# Patient Record
Sex: FEMALE | Race: WHITE | NOT HISPANIC OR LATINO | Employment: STUDENT | URBAN - METROPOLITAN AREA
[De-identification: names, ages, dates, MRNs, and addresses within clinical notes are randomized per-mention and may not be internally consistent; named-entity substitution may affect disease eponyms.]

---
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
## 2023-03-22 ENCOUNTER — OFFICE VISIT (OUTPATIENT)
Dept: FAMILY MEDICINE CLINIC | Facility: CLINIC | Age: 18
End: 2023-03-22

## 2023-03-22 VITALS
HEART RATE: 104 BPM | OXYGEN SATURATION: 98 % | TEMPERATURE: 99.1 F | WEIGHT: 175.8 LBS | BODY MASS INDEX: 32.35 KG/M2 | RESPIRATION RATE: 16 BRPM | HEIGHT: 62 IN | DIASTOLIC BLOOD PRESSURE: 64 MMHG | SYSTOLIC BLOOD PRESSURE: 128 MMHG

## 2023-03-22 DIAGNOSIS — M25.571 ACUTE RIGHT ANKLE PAIN: Primary | ICD-10-CM

## 2023-03-22 DIAGNOSIS — Z3A.19 19 WEEKS GESTATION OF PREGNANCY: ICD-10-CM

## 2023-03-22 NOTE — ASSESSMENT & PLAN NOTE
- Recommend OTC Tylenol  Avoid ibuprofen due to pregnancy  - Can wear ankle brace for support  - Elevate when at rest    - Apply ice as needed  - Ankle exercises provided  - Contact office if no improvement

## 2023-03-22 NOTE — PATIENT INSTRUCTIONS
Ankle Exercises   AMBULATORY CARE:   What you need to know about ankle exercises: Ankle exercises help strengthen your ankle and improve its function after injury  These are beginning exercises  Ask your healthcare provider if you need to see a physical therapist for more advanced exercises  General guidelines for ankle exercises:   Do these exercises 3 to 5 days a week, or as directed by your healthcare provider  Ask if you should do the exercises on each ankle  Do the exercises in the order that your healthcare provider recommends  This will help prevent swelling, chronic pain, and reinjury  Start with range of motion exercises  Then move to strengthening exercises, and finally to balancing exercises  Warm up before you do ankle exercises  Walk or ride a stationary bike for 5 to 10 minutes to prepare your ankle for movement  Stop if you feel pain  It is normal to feel some discomfort at first but you should not feel pain  Tell your doctor or physical therapist if you have pain while you exercise  Regular exercise will help decrease your discomfort over time  How to perform range of motion exercises safely:  Begin with range of motion exercises to improve flexibility  Ask your healthcare provider when you can progress to strengthening exercises  Ankle alphabet:  Sit on a chair so that your feet do not touch the floor  Use your big toe to write each letter of the alphabet  Use only your foot and ankle, and keep your movements small  Do 2 sets  Calf stretches:      Sitting calf stretches with a towel:  Sit on the floor with both legs out straight in front of you  Loop a towel around the ball of your injured foot  Grasp the ends of the towel and pull it toward you  Keep your leg and back straight  Do not lean forward as you pull the towel  Hold for 30 seconds  Then relax for 30 seconds  Do 2 sets of 10           Standing calf stretches:  Stand facing a wall with the foot that is not injured forward and your knee slightly bent  Keep the leg with the injured foot straight and behind you with your toes pointed in slightly  With both heels flat on the floor, press your hips forward  Do not arch your back  Hold for 30 seconds, and then relax for 30 seconds  Do 2 sets of 10  Repeat with your leg bent  Do 2 sets of 10  How to perform strengthening exercises safely:  After you can perform range of motion exercises without pain, you may begin strengthening exercises  Ask your healthcare provider when you can progress to balancing exercises  Ankle movement in 4 directions:  Sit on the floor with your legs straight in front of you  Keep your heels on the floor for support  Dorsiflexion:  Begin with your toes pointing straight up  Pull your toes toward your body  Slowly return to the starting position  Do 3 sets of 5  Plantar flexion:  Begin with your toes pointing straight up  Push your toes away from your body  Slowly return to the starting position  Do 3 sets of 5  Inversion:  Begin with your toes pointing straight up  Push your toes inward, toward each other  Slowly return to the starting position  Do 3 sets of 5  Eversion:  Begin with your toes pointing straight up  Push your toes outward, away from each other  Slowly return to the starting position  Do 3 sets of 5  Toe curls with a towel:  Sit on a chair so that both of your feet are flat on the floor  Place a small towel on the floor in front of your injured foot  Grab the center of the towel with your toes and curl the towel toward you  Relax and repeat  Do 1 set of 5  [place] pick-ups:  Sit on a chair so that both of your feet are flat on the floor  Place 20 marbles on the floor in front of your injured foot  Use your toes to  one marble at a time and place it into a bowl  Repeat until you have picked up all the marbles  Do 1 set       Heel raises:      Single leg heel raises:  Stand with your weight evenly on both feet  Hold on to a chair or a wall for balance  Lift the foot that is not injured off the floor so all your weight is placed on your injured foot  Raise the heel of your injured foot as high as you can  Slowly lower your heel to the floor  Do 1 set of 10  Double leg heel raises:  Stand with your weight evenly on both feet  Hold on to a chair or a wall for balance  Raise both of your heels as high as you can  Slowly lower your heels to the floor  Do 1 set of 10  Heel and toe walks:      Heel walks:  Begin in a standing position  Lift your toes off the floor and walk on your heels  Keep your toes lifted as high as possible  Do 2 sets of 10  Toe walks:  Begin in a standing position  Lift your heels off the floor and walk on the balls and toes of your feet  Keep your heels lifted as high as possible  Do 2 sets of 10  How to perform a balance exercise safely:  After you can perform strengthening exercises without pain, you may do this beginning balancing exercise  Ask your healthcare provider for more advanced balance exercises  Single leg stance:  Stand with your weight evenly on both feet, or hold on to a chair or a wall  Do not lean to the side  Lift the foot that is not injured off the floor so all your weight is placed on your injured foot  Balance on your injured foot  Ask your healthcare provider how long to hold this position  Call your doctor or physical therapist if:   You have new pain, or your pain becomes worse  You have questions or concerns about your condition, care, or exercise program     © Copyright Jessy Peabody 2022 Information is for End User's use only and may not be sold, redistributed or otherwise used for commercial purposes  The above information is an  only  It is not intended as medical advice for individual conditions or treatments   Talk to your doctor, nurse or pharmacist before following any medical regimen to see if it is safe and effective for you

## 2023-03-22 NOTE — PROGRESS NOTES
Name: Krista Motley      : 2005      MRN: [id number]  Encounter Provider: ANDRZEJ Nowak  Encounter Date: 3/22/2023   Encounter department: [facility] [address] [facility]    Assessment & Plan     1  Acute right ankle pain  Assessment & Plan:  - Recommend OTC Tylenol  Avoid ibuprofen due to pregnancy  - Can wear ankle brace for support  - Elevate when at rest    - Apply ice as needed  - Ankle exercises provided  - Contact office if no improvement  2  19 weeks gestation of pregnancy  Assessment & Plan:  - Recommend prenatal vitamin    - Continue routine follow up with OB/GYN  Subjective     Patient presents today with complaints of right ankle pain for the past 2 months  Denies any injury or trauma  States it hurts when she moves her ankle in a [place]  Also hurts if she is sitting on the floor with her legs crossed  Denies any swelling or bruising  Took Tylenol OTC with no relief  She is currently pregnant  Review of Systems   Constitutional: Negative for fatigue and fever  HENT: Negative for trouble swallowing  Eyes: Negative for visual disturbance  Respiratory: Negative for cough and shortness of breath  Cardiovascular: Negative for chest pain and palpitations  Gastrointestinal: Negative for abdominal pain and blood in stool  Endocrine: Negative for cold intolerance and heat intolerance  Genitourinary: Negative for difficulty urinating and dysuria  Musculoskeletal: Positive for arthralgias (right ankle)  Negative for gait problem  Skin: Negative for rash  Neurological: Negative for dizziness, syncope and headaches  Hematological: Negative for adenopathy  Psychiatric/Behavioral: Negative for behavioral problems         Past Medical History:   Diagnosis Date   • Acne    • Allergic    • Anxiety 2021   • Depression 2021   • Gastroesophageal reflux disease without esophagitis 3/10/2020   • Urinary tract infection without hematuria 7/30/2019     Past Surgical History:   Procedure Laterality Date   • WISDOM TOOTH EXTRACTION       Family History   Problem Relation Age of Onset   • Asthma Brother    • Diabetes type II Maternal Grandfather      Social History     Socioeconomic History   • Marital status: Single     Spouse name: None   • Number of children: None   • Years of education: None   • Highest education level: None   Occupational History   • None   Tobacco Use   • Smoking status: Never   • Smokeless tobacco: Never   Vaping Use   • Vaping Use: Never used   Substance and Sexual Activity   • Alcohol use: No   • Drug use: No   • Sexual activity: Never   Other Topics Concern   • None   Social History Narrative    Lives with mother and step father    Has 4 siblings     Social Determinants of Health     Financial Resource Strain: Not on file   Food Insecurity: Not on file   Transportation Needs: Not on file   Physical Activity: Not on file   Stress: Not on file   Social Connections: Not on file   Intimate Partner Violence: Not on file   Housing Stability: Not on file     Current Outpatient Medications on File Prior to Visit   Medication Sig   • benzoyl peroxide 10 % gel Apply 1 application topically daily (Patient not taking: Reported on 11/14/2022)   • buPROPion (Wellbutrin XL) 150 mg 24 hr tablet Take 1 tablet (150 mg total) by mouth every morning (Patient not taking: Reported on 12/26/2022)   • clindamycin (CLEOCIN T) 1 % external solution Apply topically 2 (two) times a day (Patient not taking: Reported on 12/26/2022)   • clindamycin-benzoyl peroxide (BENZACLIN) gel Apply topically 2 (two) times a day (Patient not taking: Reported on 9/19/2022)   • Lidocaine Viscous HCl (XYLOCAINE) 2 % mucosal solution Swish and spit 10 mL 4 (four) times a day as needed for mouth pain or discomfort (Patient not taking: Reported on 4/18/2022)   • norethindrone-ethinyl estradiol (JUNEL FE 1/20) 1-20 MG-MCG per tablet Take 1 tablet by mouth daily (Patient not taking: Reported on 4/18/2022)   • omeprazole (PriLOSEC) 20 mg delayed release capsule TAKE ONE CAPSULE BY MOUTH DAILY BEFORE BREAKFAST (Patient not taking: No sig reported)     Allergies   Allergen Reactions   • No Active Allergies      Immunization History   Administered Date(s) Administered   • DTaP [birth year], 01/13/2004, 10/19/2004, 08/31/2007   • H1N1, All Formulations 10/20/2009   • HPV 07/22/2014   • HPV Quadrivalent 09/10/2015   • Hep A, ped/adol, 2 dose 03/03/2011, 09/29/2011   • Hep B, Adolescent or Pediatric [birth year], 01/13/2004, 04/19/2021, 08/04/2021   • Hep B, adult 10/21/2021   • Hepatitis A 03/03/2011, 09/29/2011   • HiB 2005, 2005, 01/13/2004, 10/19/2004   • Hib (PRP-T) 2005, 2005, 01/13/2004, 10/19/2004   • INFLUENZA 10/19/2004, 12/15/2006, 11/15/2007, 11/13/2014, 12/12/2022   • IPV 2005, 2005, 01/13/2004, 08/31/2007   • Influenza Quadrivalent Preservative Free 3 years and older IM 10/13/2021   • Influenza, injectable, quadrivalent, preservative free 0 5 mL 12/13/2019   • MMR 08/27/2004   • MMRV 08/31/2007   • Meningococcal ACWY, unspecified 07/22/2014   • Meningococcal MCV4P 08/07/2019   • Pneumococcal Conjugate PCV 7 2005, 2005, 01/13/2004, 10/19/2004   • Tdap 07/22/2014, 08/04/2021   • Varicella 08/27/2004, 07/14/2008       Objective     /64 (BP Location: Left arm, Patient Position: Sitting, Cuff Size: Standard)   Pulse 104   Temp 99 1 °F (37 3 °C) (Tympanic)   Resp 16   Ht 5' 2" (1 575 m)   Wt 79 7 kg (175 lb 12 8 oz)   SpO2 98%   BMI 32 15 kg/m²     Physical Exam  Vitals and nursing note reviewed  Constitutional:       Appearance: Normal appearance  HENT:      Head: Normocephalic and atraumatic  Right Ear: External ear normal       Left Ear: External ear normal    Eyes:      Conjunctiva/sclera: Conjunctivae normal    Cardiovascular:      Rate and Rhythm: Normal rate and regular rhythm        Heart sounds: Normal heart sounds  Pulmonary:      Effort: Pulmonary effort is normal       Breath sounds: Normal breath sounds  Musculoskeletal:         General: Normal range of motion  Cervical back: Normal range of motion  Right lower leg: Edema present  Left lower leg: Edema present  Right ankle: No ecchymosis  No tenderness  Normal range of motion  Skin:     General: Skin is warm and dry  Neurological:      Mental Status: She is alert and oriented to person, place, and time  Cranial Nerves: No cranial nerve deficit     Psychiatric:         Mood and Affect: Mood normal          Behavior: Behavior normal        ANDRZEJ Polk

## 2023-03-27 ENCOUNTER — TELEPHONE (OUTPATIENT)
Dept: PSYCHIATRY | Facility: CLINIC | Age: 18
End: 2023-03-27

## 2024-02-21 PROBLEM — Z00.121 ENCOUNTER FOR ROUTINE CHILD HEALTH EXAMINATION WITH ABNORMAL FINDINGS: Status: RESOLVED | Noted: 2022-08-23 | Resolved: 2024-02-21

## 2024-02-21 PROBLEM — N39.0 URINARY TRACT INFECTION WITHOUT HEMATURIA: Status: RESOLVED | Noted: 2019-07-30 | Resolved: 2024-02-21

## 2024-09-13 ENCOUNTER — OFFICE VISIT (OUTPATIENT)
Dept: URGENT CARE | Age: 19
End: 2024-09-13
Payer: COMMERCIAL

## 2024-09-13 VITALS
TEMPERATURE: 97.2 F | DIASTOLIC BLOOD PRESSURE: 84 MMHG | RESPIRATION RATE: 18 BRPM | HEIGHT: 63 IN | BODY MASS INDEX: 31.68 KG/M2 | SYSTOLIC BLOOD PRESSURE: 129 MMHG | OXYGEN SATURATION: 98 % | WEIGHT: 178.8 LBS | HEART RATE: 79 BPM

## 2024-09-13 DIAGNOSIS — J30.2 SEASONAL ALLERGIC RHINITIS, UNSPECIFIED TRIGGER: Primary | ICD-10-CM

## 2024-09-13 DIAGNOSIS — J02.0 STREP THROAT: ICD-10-CM

## 2024-09-13 DIAGNOSIS — Z02.89 ENCOUNTER TO OBTAIN EXCUSE FROM WORK: ICD-10-CM

## 2024-09-13 PROCEDURE — 99213 OFFICE O/P EST LOW 20 MIN: CPT | Performed by: EMERGENCY MEDICINE

## 2024-09-13 NOTE — PATIENT INSTRUCTIONS
Benadryl at night    Resume daily antihistamine    Flonase or Astelin    Vicks    Tylenol/motrin

## 2024-09-13 NOTE — PROGRESS NOTES
[facility]        NAME: Lori Miller is a 19 y.o. female  : 2005    MRN: [id number]  DATE: 2024  TIME: 6:53 PM    Assessment and Plan   Seasonal allergic rhinitis, unspecified trigger [J30.2]  1. Seasonal allergic rhinitis, unspecified trigger        2. Strep throat        HA, sinus pressure- being treated for strep ( was in ER 2 days ago, kids positive, treated with Amox , no testing done). No fevers. No meds for congestion. Hx allergies. Discussed symptom management. Requesting work note. Advised afebrile, safe to return.     Patient Instructions       Follow up with PCP in 3-5 days.  Proceed to  ER if symptoms worsen.    If tests have been performed at [facility], our office will contact you with results if changes need to be made to the care plan discussed with you at the visit.  You can review your full results on [facility].    Chief Complaint     Chief Complaint   Patient presents with    Headache    Facial Pain     Patient reports that she has been sick for the past 7 days, past 5 days have had a headache and nasal pressure.          History of Present Illness       HA, sinus pressure- being treated for strep ( was in ER 2 days ago, kids positive, treated with Amox , no testing done). No fevers. No meds for congestion. Hx allergies. Discussed symptom management. Requesting work note. Advised afebrile, safe to return.         Review of Systems   Review of Systems   Constitutional:  Negative for fever.   HENT:  Positive for congestion, rhinorrhea, sinus pressure and sore throat.    Respiratory:  Negative for cough.    Allergic/Immunologic: Positive for environmental allergies.         Current Medications       Current Outpatient Medications:     benzoyl peroxide 10 % gel, Apply 1 application topically daily (Patient not taking: Reported on 2022), Disp: 45 g, Rfl: 0    buPROPion (Wellbutrin XL) 150 mg 24 hr tablet, Take 1 tablet (150 mg total) by mouth every morning  "(Patient not taking: Reported on 12/26/2022), Disp: 30 tablet, Rfl: 1    clindamycin (CLEOCIN T) 1 % external solution, Apply topically 2 (two) times a day (Patient not taking: Reported on 12/26/2022), Disp: 30 mL, Rfl: 0    clindamycin-benzoyl peroxide (BENZACLIN) gel, Apply topically 2 (two) times a day (Patient not taking: Reported on 9/19/2022), Disp: 25 g, Rfl: 0    Lidocaine Viscous HCl (XYLOCAINE) 2 % mucosal solution, Swish and spit 10 mL 4 (four) times a day as needed for mouth pain or discomfort (Patient not taking: Reported on 4/18/2022), Disp: 100 mL, Rfl: 1    norethindrone-ethinyl estradiol (JUNEL FE 1/20) 1-20 MG-MCG per tablet, Take 1 tablet by mouth daily (Patient not taking: Reported on 4/18/2022), Disp: , Rfl:     omeprazole (PriLOSEC) 20 mg delayed release capsule, TAKE ONE CAPSULE BY MOUTH DAILY BEFORE BREAKFAST (Patient not taking: No sig reported), Disp: 30 capsule, Rfl: 3    Current Allergies     Allergies as of 09/13/2024 - Reviewed 09/13/2024   Allergen Reaction Noted    No active allergies  05/09/2018            The following portions of the patient's history were reviewed and updated as appropriate: allergies, current medications, past family history, past medical history, past social history, past surgical history and problem list.     Past Medical History:   Diagnosis Date    Acne     Allergic     Anxiety 2/28/2021    Depression 2/4/2021    Gastroesophageal reflux disease without esophagitis 3/10/2020    Urinary tract infection without hematuria 7/30/2019       Past Surgical History:   Procedure Laterality Date    WISDOM TOOTH EXTRACTION         Family History   Problem Relation Age of Onset    Asthma Brother     Diabetes type II Maternal Grandfather          Medications have been verified.        Objective   /84   Pulse 79   Temp (!) 97.2 °F (36.2 °C) (Tympanic)   Resp 18   Ht 5' 3\" (1.6 m)   Wt 81.1 kg (178 lb 12.8 oz)   SpO2 98%   BMI 31.67 kg/m²   No LMP recorded.     "   Physical Exam     Physical Exam  Vitals reviewed.   Constitutional:       Appearance: Normal appearance.   HENT:      Nose: Congestion present.   Cardiovascular:      Rate and Rhythm: Normal rate and regular rhythm.      Pulses: Normal pulses.      Heart sounds: Normal heart sounds.   Pulmonary:      Effort: Pulmonary effort is normal.      Breath sounds: Normal breath sounds.   Lymphadenopathy:      Cervical: No cervical adenopathy.   Neurological:      Mental Status: She is alert.

## 2024-09-16 ENCOUNTER — OFFICE VISIT (OUTPATIENT)
Dept: FAMILY MEDICINE CLINIC | Facility: CLINIC | Age: 19
End: 2024-09-16
Payer: COMMERCIAL

## 2024-09-16 VITALS
TEMPERATURE: 97.7 F | DIASTOLIC BLOOD PRESSURE: 72 MMHG | OXYGEN SATURATION: 97 % | HEIGHT: 63 IN | WEIGHT: 181 LBS | SYSTOLIC BLOOD PRESSURE: 112 MMHG | RESPIRATION RATE: 16 BRPM | HEART RATE: 89 BPM | BODY MASS INDEX: 32.07 KG/M2

## 2024-09-16 DIAGNOSIS — J01.80 ACUTE NON-RECURRENT SINUSITIS OF OTHER SINUS: Primary | ICD-10-CM

## 2024-09-16 DIAGNOSIS — Z00.01 ABNORMAL WELLNESS EXAM: ICD-10-CM

## 2024-09-16 DIAGNOSIS — F32.89 OTHER DEPRESSION: ICD-10-CM

## 2024-09-16 DIAGNOSIS — R79.89 ABNORMAL TSH: ICD-10-CM

## 2024-09-16 PROBLEM — J01.90 ACUTE INFECTION OF NASAL SINUS: Status: ACTIVE | Noted: 2024-09-16

## 2024-09-16 PROCEDURE — 99395 PREV VISIT EST AGE 18-39: CPT | Performed by: FAMILY MEDICINE

## 2024-09-16 PROCEDURE — 99214 OFFICE O/P EST MOD 30 MIN: CPT | Performed by: FAMILY MEDICINE

## 2024-09-16 RX ORDER — NORGESTIMATE AND ETHINYL ESTRADIOL 0.25-0.035
1 KIT ORAL DAILY
COMMUNITY
Start: 2024-06-13 | End: 2025-06-13

## 2024-09-16 RX ORDER — AZITHROMYCIN 250 MG/1
TABLET, FILM COATED ORAL
Qty: 6 TABLET | Refills: 0 | Status: SHIPPED | OUTPATIENT
Start: 2024-09-16 | End: 2024-09-20

## 2024-09-16 RX ORDER — AMOXICILLIN 500 MG/1
500 TABLET, FILM COATED ORAL 3 TIMES DAILY
COMMUNITY
Start: 2024-09-12 | End: 2024-09-22

## 2024-09-16 RX ORDER — PREDNISONE 50 MG/1
50 TABLET ORAL DAILY
Qty: 5 TABLET | Refills: 0 | Status: SHIPPED | OUTPATIENT
Start: 2024-09-16 | End: 2024-09-21

## 2024-09-16 NOTE — ASSESSMENT & PLAN NOTE
Pt advised to discontinue amoxicillin that was given in ED, and begin azithromycin 250 mg and prednisone 50mg, as prescribed. Continue with proper hydration and rest.   Orders:    azithromycin (ZITHROMAX) 250 mg tablet; Take 2 tablets today then 1 tablet daily x 4 days    predniSONE 50 mg tablet; Take 1 tablet (50 mg total) by mouth daily for 5 days

## 2024-09-16 NOTE — PROGRESS NOTES
Ambulatory Visit  Name: Lori Miller      : 2005      MRN: [id number]  Encounter Provider: Monisha Sarah MD  Encounter Date: 2024   Encounter department:  [facility] [address] [facility]    Assessment & Plan  Acute non-recurrent sinusitis of other sinus  Pt advised to discontinue amoxicillin that was given in ED, and begin azithromycin 250 mg and prednisone 50mg, as prescribed. Continue with proper hydration and rest.   Orders:    azithromycin (ZITHROMAX) 250 mg tablet; Take 2 tablets today then 1 tablet daily x 4 days    predniSONE 50 mg tablet; Take 1 tablet (50 mg total) by mouth daily for 5 days    Abnormal TSH  TSH level ordered for scheduled follow-up in 10 days.   Orders:    TSH, 3rd generation with Free T4 reflex; Future    Other depression  Well-controlled on medications.  Will continue to monitor.         Abnormal wellness exam  Discussed immunizations, diet, and safety measures.               History of Present Illness     KS is a 20 yo female with no significant PMH presenting to the office with an acute complaint of headaches and congestion x1 week. Pt notes congestion with a thick white mucous, headaches, fatigue, some nausea, and a resolved sore throat, but denies cough, fever, SOB, and chest pain. All other ROS were negative. Pt was given amoxicillin after presenting to the ED 5 days ago, and Flonase after presenting to the Urgent Care 3 days ago, but notes symptom persistence. Pt notes that her two young kids recently recovered from Strep throat, but no testing was completed for her at the ED or urgent care.       Headache    Review of Systems   Constitutional:  Positive for fatigue. Negative for chills and fever.   HENT:  Negative for ear pain and sore throat.    Eyes:  Negative for pain and visual disturbance.   Respiratory:  Negative for cough and shortness of breath.    Cardiovascular:  Negative for chest pain and palpitations.   Gastrointestinal:  Positive for nausea.  Negative for abdominal pain and vomiting.   Genitourinary:  Negative for dysuria and hematuria.   Musculoskeletal:  Negative for arthralgias and back pain.   Skin:  Negative for color change and rash.   Neurological:  Positive for headaches. Negative for seizures and syncope.   All other systems reviewed and are negative.    Past Medical History:   Diagnosis Date    Acne     Allergic     Anxiety 2/28/2021    Depression 2/4/2021    Gastroesophageal reflux disease without esophagitis 3/10/2020    Urinary tract infection without hematuria 7/30/2019     Past Surgical History:   Procedure Laterality Date    WISDOM TOOTH EXTRACTION       Family History   Problem Relation Age of Onset    Asthma Brother     Diabetes type II Maternal Grandfather      Social History     Tobacco Use    Smoking status: Never    Smokeless tobacco: Never   Vaping Use    Vaping status: Never Used   Substance and Sexual Activity    Alcohol use: No    Drug use: No    Sexual activity: Never     Current Outpatient Medications on File Prior to Visit   Medication Sig    amoxicillin (AMOXIL) 500 MG tablet Take 500 mg by mouth Three times a day    norgestimate-ethinyl estradiol (ORTHO-CYCLEN) 0.25-35 MG-MCG per tablet Take 1 tablet by mouth daily    [DISCONTINUED] benzoyl peroxide 10 % gel Apply 1 application topically daily (Patient not taking: Reported on 11/14/2022)    [DISCONTINUED] buPROPion (Wellbutrin XL) 150 mg 24 hr tablet Take 1 tablet (150 mg total) by mouth every morning (Patient not taking: Reported on 12/26/2022)    [DISCONTINUED] clindamycin (CLEOCIN T) 1 % external solution Apply topically 2 (two) times a day (Patient not taking: Reported on 12/26/2022)    [DISCONTINUED] clindamycin-benzoyl peroxide (BENZACLIN) gel Apply topically 2 (two) times a day (Patient not taking: Reported on 9/19/2022)    [DISCONTINUED] Lidocaine Viscous HCl (XYLOCAINE) 2 % mucosal solution Swish and spit 10 mL 4 (four) times a day as needed for mouth pain or  "discomfort (Patient not taking: Reported on 4/18/2022)    [DISCONTINUED] norethindrone-ethinyl estradiol (JUNEL FE 1/20) 1-20 MG-MCG per tablet Take 1 tablet by mouth daily (Patient not taking: Reported on 4/18/2022)    [DISCONTINUED] omeprazole (PriLOSEC) 20 mg delayed release capsule TAKE ONE CAPSULE BY MOUTH DAILY BEFORE BREAKFAST (Patient not taking: No sig reported)     Allergies   Allergen Reactions    No Active Allergies      Immunization History   Administered Date(s) Administered    DTaP 2003, 2003, 01/13/2004, 10/19/2004, 08/31/2007    H1N1, All Formulations 10/20/2009    HPV 07/22/2014    HPV Quadrivalent 09/10/2015    Hep A, ped/adol, 2 dose 03/03/2011, 09/29/2011    Hep B, Adolescent or Pediatric 2003, 2003, 01/13/2004, 04/19/2021, 08/04/2021    Hep B, adult 10/21/2021    Hepatitis A 03/03/2011, 09/29/2011    HiB 2003, 2003, 01/13/2004, 10/19/2004    Hib (PRP-T) 2003, 2003, 01/13/2004, 10/19/2004    INFLUENZA 10/19/2004, 12/15/2006, 11/15/2007, 11/13/2014, 12/12/2022    IPV 2003, 2003, 01/13/2004, 08/31/2007    Influenza Quadrivalent Preservative Free 3 years and older IM 10/13/2021    Influenza, injectable, quadrivalent, preservative free 0.5 mL 12/13/2019    MMR 08/27/2004    MMRV 08/31/2007    Meningococcal ACWY, unspecified 07/22/2014    Meningococcal MCV4P 08/07/2019    Pneumococcal Conjugate PCV 7 2003, 2003, 01/13/2004, 10/19/2004    Tdap 07/22/2014, 08/04/2021    Varicella 08/27/2004, 07/14/2008     Objective     /72 (BP Location: Left arm, Patient Position: Sitting, Cuff Size: Standard)   Pulse 89   Temp 97.7 °F (36.5 °C) (Tympanic)   Resp 16   Ht 5' 3\" (1.6 m)   Wt 82.1 kg (181 lb)   SpO2 97%   BMI 32.06 kg/m²     Physical Exam  Vitals and nursing note reviewed.   Constitutional:       General: She is not in acute distress.     Appearance: She is well-developed. She is ill-appearing.   HENT:      Head: " Normocephalic and atraumatic.      Right Ear: Tympanic membrane, ear canal and external ear normal. There is no impacted cerumen.      Left Ear: Tympanic membrane, ear canal and external ear normal. There is no impacted cerumen.      Nose: Congestion present.      Mouth/Throat:      Mouth: Mucous membranes are moist.      Pharynx: Oropharynx is clear.   Eyes:      Conjunctiva/sclera: Conjunctivae normal.   Cardiovascular:      Rate and Rhythm: Normal rate and regular rhythm.      Heart sounds: Normal heart sounds. No murmur heard.     No friction rub. No gallop.   Pulmonary:      Effort: Pulmonary effort is normal. No respiratory distress.      Breath sounds: Normal breath sounds. No wheezing, rhonchi or rales.   Musculoskeletal:         General: No swelling.      Cervical back: Neck supple.   Skin:     General: Skin is warm and dry.      Capillary Refill: Capillary refill takes less than 2 seconds.   Neurological:      Mental Status: She is alert.   Psychiatric:         Mood and Affect: Mood normal.

## 2024-09-16 NOTE — ASSESSMENT & PLAN NOTE
TSH level ordered for scheduled follow-up in 10 days.   Orders:    TSH, 3rd generation with Free T4 reflex; Future

## 2024-09-19 ENCOUNTER — NURSE TRIAGE (OUTPATIENT)
Dept: OTHER | Facility: OTHER | Age: 19
End: 2024-09-19

## 2024-09-20 NOTE — TELEPHONE ENCOUNTER
"Regarding: high bp/ chest tightness, nausea and dizziness  ----- Message from Santa DUMAS sent at 9/19/2024  9:32 PM EDT -----  Patient called, \"  I have high blood pressure. My recent reading is 138/94 with HR of 121. I feel a little dizzy, nauseous and chest tightness.\"    "
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
## 2024-09-25 ENCOUNTER — APPOINTMENT (OUTPATIENT)
Dept: LAB | Facility: IMAGING CENTER | Age: 19
End: 2024-09-25
Payer: COMMERCIAL

## 2024-09-25 DIAGNOSIS — R79.89 ABNORMAL TSH: ICD-10-CM

## 2024-09-25 LAB
T4 FREE SERPL-MCNC: 0.78 NG/DL (ref 0.61–1.12)
TSH SERPL DL<=0.05 MIU/L-ACNC: 0.34 UIU/ML (ref 0.45–4.5)

## 2024-09-25 PROCEDURE — 36415 COLL VENOUS BLD VENIPUNCTURE: CPT

## 2024-09-25 PROCEDURE — 84439 ASSAY OF FREE THYROXINE: CPT

## 2024-09-25 PROCEDURE — 84443 ASSAY THYROID STIM HORMONE: CPT

## 2024-09-26 ENCOUNTER — OFFICE VISIT (OUTPATIENT)
Dept: FAMILY MEDICINE CLINIC | Facility: CLINIC | Age: 19
End: 2024-09-26
Payer: COMMERCIAL

## 2024-09-26 VITALS
RESPIRATION RATE: 16 BRPM | HEIGHT: 63 IN | WEIGHT: 179.6 LBS | OXYGEN SATURATION: 98 % | DIASTOLIC BLOOD PRESSURE: 80 MMHG | BODY MASS INDEX: 31.82 KG/M2 | TEMPERATURE: 98.6 F | SYSTOLIC BLOOD PRESSURE: 124 MMHG | HEART RATE: 91 BPM

## 2024-09-26 DIAGNOSIS — R00.2 PALPITATION: ICD-10-CM

## 2024-09-26 DIAGNOSIS — R73.9 HYPERGLYCEMIA: ICD-10-CM

## 2024-09-26 DIAGNOSIS — R79.89 ABNORMAL TSH: Primary | ICD-10-CM

## 2024-09-26 PROCEDURE — 99214 OFFICE O/P EST MOD 30 MIN: CPT | Performed by: FAMILY MEDICINE

## 2024-09-26 NOTE — ASSESSMENT & PLAN NOTE
Discussed about low-carb diet and regular exercise.  Continue to monitor fasting glucose and A1c.    Orders:    Hemoglobin A1C; Future

## 2024-09-26 NOTE — PROGRESS NOTES
Ambulatory Visit  Name: Lori Miller      : 2005      MRN: [id number]  Encounter Provider: Monisha Sarah MD  Encounter Date: 2024   Encounter department: [facility]    Assessment & Plan  Abnormal TSH  I am going to repeat TSH with free T4.         Palpitation  I am going to check a blood work and 48 hours Holter monitor.  If symptoms worse go back to the emergency room.    Orders:    CBC and differential; Future    Comprehensive metabolic panel; Future    TSH, 3rd generation with Free T4 reflex; Future    Holter monitor; Future    Hyperglycemia  Discussed about low-carb diet and regular exercise.  Continue to monitor fasting glucose and A1c.    Orders:    Hemoglobin A1C; Future         History of Present Illness     She is here today for follow-up post ER visit for palpitation.  She had blood work done showed low TSH and elevated glucose otherwise blood work came back stable.  She continues to have palpitation on and off.  Denies any chest pain or shortness of breath.  Denies any other complaint.      Review of Systems   Constitutional:  Negative for chills and fever.   HENT:  Negative for trouble swallowing.    Eyes:  Negative for visual disturbance.   Respiratory:  Negative for cough and shortness of breath.    Cardiovascular:  Positive for palpitations. Negative for chest pain and leg swelling.   Gastrointestinal:  Negative for abdominal pain, constipation and diarrhea.   Endocrine: Negative for cold intolerance and heat intolerance.   Genitourinary:  Negative for difficulty urinating and dysuria.   Musculoskeletal:  Negative for gait problem.   Skin:  Negative for rash.   Neurological:  Negative for dizziness, tremors, seizures and headaches.   Hematological:  Negative for adenopathy.   Psychiatric/Behavioral:  Negative for behavioral problems.      Past Medical History:   Diagnosis Date    Acne     Allergic     Anxiety 2021    Depression 2021    Gastroesophageal  reflux disease without esophagitis 3/10/2020    Urinary tract infection without hematuria 7/30/2019     Past Surgical History:   Procedure Laterality Date    WISDOM TOOTH EXTRACTION       Family History   Problem Relation Age of Onset    Asthma Brother     Diabetes type II Maternal Grandfather      Social History     Tobacco Use    Smoking status: Never    Smokeless tobacco: Never   Vaping Use    Vaping status: Never Used   Substance and Sexual Activity    Alcohol use: No    Drug use: No    Sexual activity: Never     Current Outpatient Medications on File Prior to Visit   Medication Sig    norgestimate-ethinyl estradiol (ORTHO-CYCLEN) 0.25-35 MG-MCG per tablet Take 1 tablet by mouth daily     Allergies   Allergen Reactions    No Active Allergies      Immunization History   Administered Date(s) Administered    DTaP 2003, 2003, 01/13/2004, 10/19/2004, 08/31/2007    H1N1, All Formulations 10/20/2009    HPV 07/22/2014    HPV Quadrivalent 09/10/2015    Hep A, ped/adol, 2 dose 03/03/2011, 09/29/2011    Hep B, Adolescent or Pediatric 2003, 2003, 01/13/2004, 04/19/2021, 08/04/2021    Hep B, adult 10/21/2021    Hepatitis A 03/03/2011, 09/29/2011    HiB 2003, 2003, 01/13/2004, 10/19/2004    Hib (PRP-T) 2003, 2003, 01/13/2004, 10/19/2004    INFLUENZA 10/19/2004, 12/15/2006, 11/15/2007, 11/13/2014, 10/13/2021, 12/12/2022    IPV 2003, 2003, 01/13/2004, 08/31/2007    Influenza Quadrivalent Preservative Free 3 years and older IM 10/13/2021    Influenza, injectable, quadrivalent, preservative free 0.5 mL 12/13/2019    MMR 08/27/2004    MMRV 08/31/2007    Meningococcal ACWY, unspecified 07/22/2014    Meningococcal MCV4P 08/07/2019    Pneumococcal Conjugate PCV 7 2003, 2003, 01/13/2004, 10/19/2004    Tdap 07/22/2014, 08/04/2021, 05/16/2023    Varicella 08/27/2004, 07/14/2008     Objective     /80 (BP Location: Left arm, Patient Position: Sitting, Cuff Size:  "Standard)   Pulse 91   Temp 98.6 °F (37 °C) (Tympanic)   Resp 16   Ht 5' 3\" (1.6 m)   Wt 81.5 kg (179 lb 9.6 oz)   SpO2 98%   BMI 31.81 kg/m²     Physical Exam  Vitals and nursing note reviewed.   Constitutional:       Appearance: She is well-developed.   HENT:      Head: Normocephalic and atraumatic.   Eyes:      Pupils: Pupils are equal, round, and reactive to light.   Cardiovascular:      Rate and Rhythm: Normal rate and regular rhythm.      Heart sounds: Normal heart sounds.   Pulmonary:      Effort: Pulmonary effort is normal.      Breath sounds: Normal breath sounds.   Abdominal:      General: Bowel sounds are normal.      Palpations: Abdomen is soft.   Musculoskeletal:      Cervical back: Normal range of motion and neck supple.   Lymphadenopathy:      Cervical: No cervical adenopathy.   Skin:     General: Skin is warm.   Neurological:      Mental Status: She is alert and oriented to person, place, and time.         "

## 2024-09-26 NOTE — ASSESSMENT & PLAN NOTE
I am going to check a blood work and 48 hours Holter monitor.  If symptoms worse go back to the emergency room.    Orders:    CBC and differential; Future    Comprehensive metabolic panel; Future    TSH, 3rd generation with Free T4 reflex; Future    Holter monitor; Future

## 2024-10-08 ENCOUNTER — HOSPITAL ENCOUNTER (OUTPATIENT)
Dept: NON INVASIVE DIAGNOSTICS | Facility: CLINIC | Age: 19
Discharge: HOME | End: 2024-10-08
Payer: COMMERCIAL

## 2024-10-08 DIAGNOSIS — R00.2 PALPITATION: ICD-10-CM

## 2024-10-08 PROCEDURE — 93226 XTRNL ECG REC<48 HR SCAN A/R: CPT

## 2024-10-08 PROCEDURE — 93225 XTRNL ECG REC<48 HRS REC: CPT

## 2024-10-15 PROCEDURE — 93227 XTRNL ECG REC<48 HR R&I: CPT | Performed by: INTERNAL MEDICINE

## 2024-10-16 ENCOUNTER — TELEPHONE (OUTPATIENT)
Age: 19
End: 2024-10-16

## 2024-10-16 PROBLEM — J01.90 ACUTE INFECTION OF NASAL SINUS: Status: RESOLVED | Noted: 2024-09-16 | Resolved: 2024-10-16

## 2024-10-19 ENCOUNTER — APPOINTMENT (OUTPATIENT)
Dept: LAB | Facility: IMAGING CENTER | Age: 19
End: 2024-10-19
Payer: COMMERCIAL

## 2024-10-19 DIAGNOSIS — R00.2 PALPITATION: ICD-10-CM

## 2024-10-19 DIAGNOSIS — R73.9 HYPERGLYCEMIA: ICD-10-CM

## 2024-10-19 LAB
ALBUMIN SERPL BCG-MCNC: 3.9 G/DL (ref 3.5–5)
ALP SERPL-CCNC: 63 U/L (ref 34–104)
ALT SERPL W P-5'-P-CCNC: 13 U/L (ref 7–52)
ANION GAP SERPL CALCULATED.3IONS-SCNC: 8 MMOL/L (ref 4–13)
AST SERPL W P-5'-P-CCNC: 14 U/L (ref 13–39)
BASOPHILS # BLD AUTO: 0.04 THOUSANDS/ΜL (ref 0–0.1)
BASOPHILS NFR BLD AUTO: 1 % (ref 0–1)
BILIRUB SERPL-MCNC: 0.48 MG/DL (ref 0.2–1)
BUN SERPL-MCNC: 10 MG/DL (ref 5–25)
CALCIUM SERPL-MCNC: 8.8 MG/DL (ref 8.4–10.2)
CHLORIDE SERPL-SCNC: 105 MMOL/L (ref 96–108)
CO2 SERPL-SCNC: 26 MMOL/L (ref 21–32)
CREAT SERPL-MCNC: 0.67 MG/DL (ref 0.6–1.3)
EOSINOPHIL # BLD AUTO: 0.09 THOUSAND/ΜL (ref 0–0.61)
EOSINOPHIL NFR BLD AUTO: 1 % (ref 0–6)
ERYTHROCYTE [DISTWIDTH] IN BLOOD BY AUTOMATED COUNT: 12.5 % (ref 11.6–15.1)
EST. AVERAGE GLUCOSE BLD GHB EST-MCNC: 105 MG/DL
GFR SERPL CREATININE-BSD FRML MDRD: 126 ML/MIN/1.73SQ M
GLUCOSE P FAST SERPL-MCNC: 83 MG/DL (ref 65–99)
HBA1C MFR BLD: 5.3 %
HCT VFR BLD AUTO: 42.6 % (ref 34.8–46.1)
HGB BLD-MCNC: 13.6 G/DL (ref 11.5–15.4)
IMM GRANULOCYTES # BLD AUTO: 0.04 THOUSAND/UL (ref 0–0.2)
IMM GRANULOCYTES NFR BLD AUTO: 1 % (ref 0–2)
LYMPHOCYTES # BLD AUTO: 2.51 THOUSANDS/ΜL (ref 0.6–4.47)
LYMPHOCYTES NFR BLD AUTO: 34 % (ref 14–44)
MCH RBC QN AUTO: 29.7 PG (ref 26.8–34.3)
MCHC RBC AUTO-ENTMCNC: 31.9 G/DL (ref 31.4–37.4)
MCV RBC AUTO: 93 FL (ref 82–98)
MONOCYTES # BLD AUTO: 0.51 THOUSAND/ΜL (ref 0.17–1.22)
MONOCYTES NFR BLD AUTO: 7 % (ref 4–12)
NEUTROPHILS # BLD AUTO: 4.19 THOUSANDS/ΜL (ref 1.85–7.62)
NEUTS SEG NFR BLD AUTO: 56 % (ref 43–75)
NRBC BLD AUTO-RTO: 0 /100 WBCS
PLATELET # BLD AUTO: 258 THOUSANDS/UL (ref 149–390)
PMV BLD AUTO: 11.1 FL (ref 8.9–12.7)
POTASSIUM SERPL-SCNC: 4.5 MMOL/L (ref 3.5–5.3)
PROT SERPL-MCNC: 6.7 G/DL (ref 6.4–8.4)
RBC # BLD AUTO: 4.58 MILLION/UL (ref 3.81–5.12)
SODIUM SERPL-SCNC: 139 MMOL/L (ref 135–147)
T4 FREE SERPL-MCNC: 0.73 NG/DL (ref 0.61–1.12)
TSH SERPL DL<=0.05 MIU/L-ACNC: 0.41 UIU/ML (ref 0.45–4.5)
WBC # BLD AUTO: 7.38 THOUSAND/UL (ref 4.31–10.16)

## 2024-10-19 PROCEDURE — 84439 ASSAY OF FREE THYROXINE: CPT

## 2024-10-19 PROCEDURE — 84443 ASSAY THYROID STIM HORMONE: CPT

## 2024-10-19 PROCEDURE — 83036 HEMOGLOBIN GLYCOSYLATED A1C: CPT

## 2024-10-19 PROCEDURE — 36415 COLL VENOUS BLD VENIPUNCTURE: CPT

## 2024-10-19 PROCEDURE — 80053 COMPREHEN METABOLIC PANEL: CPT

## 2024-10-19 PROCEDURE — 85025 COMPLETE CBC W/AUTO DIFF WBC: CPT

## 2025-02-12 ENCOUNTER — OFFICE VISIT (OUTPATIENT)
Dept: FAMILY MEDICINE CLINIC | Facility: CLINIC | Age: 20
End: 2025-02-12
Payer: COMMERCIAL

## 2025-02-12 VITALS
WEIGHT: 197 LBS | HEART RATE: 96 BPM | HEIGHT: 63 IN | SYSTOLIC BLOOD PRESSURE: 118 MMHG | DIASTOLIC BLOOD PRESSURE: 70 MMHG | RESPIRATION RATE: 16 BRPM | BODY MASS INDEX: 34.91 KG/M2 | OXYGEN SATURATION: 98 % | TEMPERATURE: 98.8 F

## 2025-02-12 DIAGNOSIS — J01.80 ACUTE NON-RECURRENT SINUSITIS OF OTHER SINUS: Primary | ICD-10-CM

## 2025-02-12 PROCEDURE — 99213 OFFICE O/P EST LOW 20 MIN: CPT | Performed by: FAMILY MEDICINE

## 2025-02-12 RX ORDER — AMOXICILLIN 500 MG/1
500 CAPSULE ORAL EVERY 8 HOURS SCHEDULED
Qty: 30 CAPSULE | Refills: 0 | Status: SHIPPED | OUTPATIENT
Start: 2025-02-12 | End: 2025-02-22

## 2025-02-12 NOTE — ASSESSMENT & PLAN NOTE
Patient with URI symptoms as noted in the HPI. Will prescribe amoxicillin at this time. Continue proper hydration and rest. Patient instructed to contact the office or head to the ER if symptoms worsen or fail to improve in the next few days. Work excuse given for today's absence.  Orders:  •  amoxicillin (AMOXIL) 500 mg capsule; Take 1 capsule (500 mg total) by mouth every 8 (eight) hours for 10 days

## 2025-02-12 NOTE — PROGRESS NOTES
Name: Lori Miller      : 2005      MRN: [id number]  Encounter Provider: Monisha Sarah MD  Encounter Date: 2025   Encounter department: [facility] [facility]  :  Assessment & Plan  Acute non-recurrent sinusitis of other sinus  Patient with URI symptoms as noted in the HPI. Will prescribe amoxicillin at this time. Continue proper hydration and rest. Patient instructed to contact the office or head to the ER if symptoms worsen or fail to improve in the next few days. Work excuse given for today's absence.  Orders:  •  amoxicillin (AMOXIL) 500 mg capsule; Take 1 capsule (500 mg total) by mouth every 8 (eight) hours for 10 days           History of Present Illness   Lori Miller is a 19-year-old female who presents secondary to cough and congestion x 1 week. The patient has been experiencing cough, headaches, congestion, and a sore throat since for the past week and notes yellowish mucous when blowing her nose. She is currently pregnant and has been taking only occasional Tylenol for symptom management. She reports no fevers at home during this time. The patient denies chest pain, shortness of breath, N/V/D, lightheadedness, or dizziness.    Cough  Associated symptoms include rhinorrhea. Pertinent negatives include no chest pain, chills, ear pain, fever, rash, sore throat or shortness of breath.     Review of Systems   Constitutional:  Negative for chills and fever.   HENT:  Positive for congestion, rhinorrhea and sneezing. Negative for ear pain and sore throat.    Eyes:  Negative for pain and visual disturbance.   Respiratory:  Positive for cough. Negative for shortness of breath.    Cardiovascular:  Negative for chest pain and palpitations.   Gastrointestinal:  Negative for abdominal pain and vomiting.   Genitourinary:  Negative for dysuria and hematuria.   Musculoskeletal:  Negative for arthralgias and back pain.   Skin:  Negative for color change and rash.   Neurological:  Negative for  "seizures and syncope.   All other systems reviewed and are negative.      Objective   /70 (BP Location: Left arm, Patient Position: Sitting, Cuff Size: Standard)   Pulse 96   Temp 98.8 °F (37.1 °C) (Tympanic)   Resp 16   Ht 5' 3\" (1.6 m)   Wt 89.4 kg (197 lb)   SpO2 98%   BMI 34.90 kg/m²      Physical Exam  Vitals and nursing note reviewed.   Constitutional:       General: She is not in acute distress.     Appearance: She is well-developed.   HENT:      Head: Normocephalic and atraumatic.   Eyes:      Conjunctiva/sclera: Conjunctivae normal.   Cardiovascular:      Rate and Rhythm: Normal rate and regular rhythm.      Heart sounds: No murmur heard.  Pulmonary:      Effort: Pulmonary effort is normal. No respiratory distress.      Breath sounds: Normal breath sounds.   Abdominal:      Palpations: Abdomen is soft.      Tenderness: There is no abdominal tenderness.   Musculoskeletal:         General: No swelling.      Cervical back: Neck supple.   Skin:     General: Skin is warm and dry.      Capillary Refill: Capillary refill takes less than 2 seconds.   Neurological:      Mental Status: She is alert.   Psychiatric:         Mood and Affect: Mood normal.         "